# Patient Record
(demographics unavailable — no encounter records)

---

## 2025-03-01 NOTE — ASSESSMENT
[FreeTextEntry1] : Discussed PSA results with patient- 9.50 likely due to enlarged prostate (159cc) that is not bothersome to the patient. Repeat prostate MRI in 3 months.  He will follow-up in 3 months to discuss MRI results.  All questions answered patient agreeable with plan. 20 min discussion of elevated PSA evaluation and follow-up.

## 2025-03-01 NOTE — HISTORY OF PRESENT ILLNESS
[FreeTextEntry1] : 74 yo male presets today for a follow-up appointment for elevated PSA.  Recent PSA: 9.50 with a free PSA of 29% on (2.19.25) 15.20 on (4.23.24) 10.30 on (10.31.23) 13.00 on (4.11.23) 15.20 on (4.5.23)  Most recent prostate MRI on (5.7.24) showed one PIRADS 4 lesion (right posterior medial)  PV: 159mL PSAD: 0.10  TP prostate biopsy fusion on (8.5.24) was negative for carcinoma.  On terazosin 2mg. Nocturia x2  Family history of prostate cancer: father

## 2025-03-01 NOTE — ADDENDUM
Please call patient to schedule a follow up visit with me in 3 months for osteopenia with a DEXA scan to be completed on the same day prior to the office visit.    [FreeTextEntry1] : I, Lindsey Meneses assisted in documentation on 02/26/2025 at acting as a scribe for and in the presence of Dr. Brayan Warren.

## 2025-03-01 NOTE — ADDENDUM
[FreeTextEntry1] : I, Lindsey Meneses assisted in documentation on 02/26/2025 at acting as a scribe for and in the presence of Dr. Brayan Warren.

## 2025-06-23 NOTE — HISTORY OF PRESENT ILLNESS
[FreeTextEntry1] : 76 y/o male presents today for a follow-up appointment for elevated PSA and MRI review. He has been on active surveillance for prostate cancer. Most recent prostate biopsy on (8.5.24) was negative for carcinoma.  He was being followed by another urologist and his biopsy history is detailed below:  Initial diagnosis in March 2015 Biopsy 3/4/15 PSA 8.4 Mark 6 5% of 1 core Biopsy 11/17/16 PSA 6.4 Benign Biopsy 1/29/2019 PSA 8.8 [place] 7 30% of 1 core Biopsy on (6.15.23) by Dr. Gupta showed [place] 3+3 involving less than 5% of one core. TP Biopsy fusion on (8.5.24) was negative for carcinoma.  Prostate MRI on (5.27.25) showed one PIRADS 4 lesion in right posterior peripheral zone, decreased in size from prior study and grossly unchanged from original MRI. PV: 162 mL PSAD: 0.06  Recent PSA: 9.50 with a free PSA of 29% on (2.19.25) 15.20 on (4.23.24) 10.30 on (10.31.23) 13.00 on (4.11.23) 15.20 on (4.5.23)  On Terazosin 2mg. Nocturia x2  Family history of prostate cancer: Father

## 2025-06-23 NOTE — ADDENDUM
[FreeTextEntry1] : Dina LOVE NP, assisted with documentation on 06/16/2025 in the presence and under the direction of Dr. Brayan Warren.

## 2025-06-23 NOTE — HISTORY OF PRESENT ILLNESS
[FreeTextEntry1] : 76 y/o male presents today for a follow-up appointment for elevated PSA and MRI review. He has been on active surveillance for prostate cancer. Most recent prostate biopsy on (8.5.24) was negative for carcinoma.  He was being followed by another urologist and his biopsy history is detailed below:  Initial diagnosis in March 2015 Biopsy 3/4/15 PSA 8.4 Mark 6 5% of 1 core Biopsy 11/17/16 PSA 6.4 Benign Biopsy 1/29/2019 PSA 8.8 [place] 7 30% of 1 core Biopsy on (6.15.23) by Dr. Gupta showed [place] 3+3 involving less than 5% of one core. TP Biopsy fusion on (8.5.24) was negative for carcinoma.  Prostate MRI on (5.27.25) showed one PIRADS 4 lesion in right posterior peripheral zone, decreased in size from prior study and grossly unchanged from original MRI. PV: 162 mL PSAD: 0.06  Recent PSA: 9.50 with a free PSA of 29% on (2.19.25) 15.20 on (4.23.24) 10.30 on (10.31.23) 13.00 on (4.11.23) 15.20 on (4.5.23)  On Terazosin 2mg. Nocturia x2  Family history of prostate cancer: Father

## 2025-06-23 NOTE — ASSESSMENT
[FreeTextEntry1] : Reviewed prostate MRI with patient. Lesion has decreased in size from prior study and grossly unchanged from original MRI on 4/26/2023. Plan to monitor for now, will repeat PSA in 6 months. Next follow up appointment in 6 months. All questions answered patient agreeable with plan. 20 min discussion of elevated PSA evaluation and follow-up. Time spent is for reviewing chart, labs and images (if available), counseling and care coordination.